# Patient Record
Sex: FEMALE | Race: WHITE | NOT HISPANIC OR LATINO
[De-identification: names, ages, dates, MRNs, and addresses within clinical notes are randomized per-mention and may not be internally consistent; named-entity substitution may affect disease eponyms.]

---

## 2017-02-27 ENCOUNTER — APPOINTMENT (OUTPATIENT)
Dept: SURGERY | Facility: CLINIC | Age: 47
End: 2017-02-27

## 2017-02-27 DIAGNOSIS — Z80.49 FAMILY HISTORY OF MALIGNANT NEOPLASM OF OTHER GENITAL ORGANS: ICD-10-CM

## 2017-02-27 RX ORDER — TAMOXIFEN CITRATE 20 MG/1
TABLET, FILM COATED ORAL
Refills: 0 | Status: ACTIVE | COMMUNITY

## 2017-06-19 ENCOUNTER — APPOINTMENT (OUTPATIENT)
Dept: SURGERY | Facility: CLINIC | Age: 47
End: 2017-06-19

## 2017-06-19 VITALS
DIASTOLIC BLOOD PRESSURE: 80 MMHG | RESPIRATION RATE: 12 BRPM | HEIGHT: 65 IN | SYSTOLIC BLOOD PRESSURE: 120 MMHG | TEMPERATURE: 98 F | OXYGEN SATURATION: 98 % | HEART RATE: 70 BPM | BODY MASS INDEX: 23.32 KG/M2 | WEIGHT: 140 LBS

## 2017-12-11 ENCOUNTER — APPOINTMENT (OUTPATIENT)
Dept: SURGERY | Facility: CLINIC | Age: 47
End: 2017-12-11
Payer: COMMERCIAL

## 2017-12-11 VITALS
TEMPERATURE: 99 F | WEIGHT: 130 LBS | DIASTOLIC BLOOD PRESSURE: 77 MMHG | HEIGHT: 65 IN | RESPIRATION RATE: 13 BRPM | HEART RATE: 72 BPM | BODY MASS INDEX: 21.66 KG/M2 | OXYGEN SATURATION: 96 % | SYSTOLIC BLOOD PRESSURE: 136 MMHG

## 2017-12-11 DIAGNOSIS — Z80.1 FAMILY HISTORY OF MALIGNANT NEOPLASM OF TRACHEA, BRONCHUS AND LUNG: ICD-10-CM

## 2017-12-11 DIAGNOSIS — C50.912 MALIGNANT NEOPLASM OF UNSPECIFIED SITE OF LEFT FEMALE BREAST: ICD-10-CM

## 2017-12-11 DIAGNOSIS — R59.9 ENLARGED LYMPH NODES, UNSPECIFIED: ICD-10-CM

## 2017-12-11 PROCEDURE — 99214 OFFICE O/P EST MOD 30 MIN: CPT

## 2017-12-13 PROBLEM — C50.912 DUCTAL CARCINOMA OF LEFT BREAST: Status: RESOLVED | Noted: 2017-12-13 | Resolved: 2017-12-13

## 2017-12-13 PROBLEM — Z80.1 FAMILY HISTORY OF LUNG CANCER: Status: ACTIVE | Noted: 2017-12-13

## 2017-12-13 PROBLEM — R59.9 ENLARGED LYMPH NODE: Status: ACTIVE | Noted: 2017-12-13

## 2017-12-13 RX ORDER — CEPHALEXIN 500 MG/1
500 CAPSULE ORAL
Qty: 10 | Refills: 0 | Status: COMPLETED | COMMUNITY
Start: 2017-10-13

## 2018-03-19 ENCOUNTER — APPOINTMENT (OUTPATIENT)
Dept: SURGERY | Facility: CLINIC | Age: 48
End: 2018-03-19
Payer: COMMERCIAL

## 2018-03-19 VITALS
BODY MASS INDEX: 21.66 KG/M2 | HEART RATE: 88 BPM | DIASTOLIC BLOOD PRESSURE: 79 MMHG | OXYGEN SATURATION: 100 % | SYSTOLIC BLOOD PRESSURE: 124 MMHG | HEIGHT: 65 IN | TEMPERATURE: 98.3 F | WEIGHT: 130 LBS

## 2018-03-19 PROCEDURE — 99213 OFFICE O/P EST LOW 20 MIN: CPT

## 2018-03-20 RX ORDER — NITROFURANTOIN (MONOHYDRATE/MACROCRYSTALS) 25; 75 MG/1; MG/1
100 CAPSULE ORAL
Qty: 10 | Refills: 0 | Status: ACTIVE | COMMUNITY
Start: 2018-01-02

## 2018-05-29 ENCOUNTER — APPOINTMENT (OUTPATIENT)
Dept: SURGERY | Facility: CLINIC | Age: 48
End: 2018-05-29
Payer: COMMERCIAL

## 2018-05-29 VITALS
OXYGEN SATURATION: 94 % | TEMPERATURE: 98.4 F | HEIGHT: 65 IN | DIASTOLIC BLOOD PRESSURE: 71 MMHG | WEIGHT: 133.38 LBS | BODY MASS INDEX: 22.22 KG/M2 | SYSTOLIC BLOOD PRESSURE: 128 MMHG | HEART RATE: 90 BPM

## 2018-05-29 PROCEDURE — 99214 OFFICE O/P EST MOD 30 MIN: CPT

## 2018-11-13 ENCOUNTER — APPOINTMENT (OUTPATIENT)
Dept: SURGERY | Facility: CLINIC | Age: 48
End: 2018-11-13

## 2019-01-14 ENCOUNTER — APPOINTMENT (OUTPATIENT)
Dept: SURGERY | Facility: CLINIC | Age: 49
End: 2019-01-14

## 2019-01-14 ENCOUNTER — APPOINTMENT (OUTPATIENT)
Dept: BREAST CENTER | Facility: CLINIC | Age: 49
End: 2019-01-14
Payer: COMMERCIAL

## 2019-01-14 VITALS
BODY MASS INDEX: 20.52 KG/M2 | WEIGHT: 123.31 LBS | TEMPERATURE: 98.5 F | DIASTOLIC BLOOD PRESSURE: 68 MMHG | SYSTOLIC BLOOD PRESSURE: 96 MMHG | HEART RATE: 60 BPM

## 2019-01-14 PROCEDURE — 99214 OFFICE O/P EST MOD 30 MIN: CPT

## 2019-01-14 NOTE — ASSESSMENT
[FreeTextEntry1] : 48 yo premenopausal female with history of Stage 1 left breast cancer ( IDC grade 1 ER 70% MO 90%) with DCIS, ADH, and LCIS treated in 2015 with bilateral mastectomy and SLNB (implant reconstruction).  Clinical breast exam is benign and no longer reports of left arm swelling. Recommendation to continue yoga because of positive stress relieving qualities and follow up in 6 months 7/2019 for CBE.\par 1. Follow up in 6 months 7/2019\par 2. Continue YOGA\par 3.  Continue Tamoxifen\par

## 2019-01-14 NOTE — HISTORY OF PRESENT ILLNESS
[FreeTextEntry1] : I had the pleasure of seeing Reina Sierra in the office today for w follow up clinical evaluation.\par \par Reina is a ghazal 46 yo premenopausal female who has a history of left breast cancer (Stage 1) diagnosed and treated in  with bilateral mastectomy and left sentinel lymph node biopsy.  she underwent expander/implant reconstruction.  Pathology on initial lumpectomy demonstrated invasive ductal carcinoma measuring 6mm ER+ [place] + [id number] negative with DCIS, ADH, and LCIS and negative margins.  Mastectomy final pathology was negative for residual malignancy and 0/5 nodes were negative.\par \par Reina has been taking Tamoxifen and tolerating it well.  \par \par She denies headache, blurry vision, chest pain, SOB, abdominal pain, joint aches, or difficulty walking. \par \par Reina is in good health and is .  she started menses at age 12 with delivery at age 35.  she still gets menses with normal flow.  She is under the care of Dr. Garcia (Ob-Gyn).\par \par Family history is significant for PGM dx with uterine cancer at age 78, PGF dx with bone cancer (unknown age) and Father diagnosed with lung cancer (unknown age). \par \par [facility]\par 2017: Impression:  small appearing subcutaneous lymph node correlating with palpable abnormality right lateral chest wall.\par \par We reviewed clinical examination and recent imaging. Clinical examination is benign. No dominant masses palpable.  She no longer reports pain down left arm or swelling. She had her nipple placed by Dr. Clemons on 2019 and is seeing him again on the  to discuss tattooing of the nipple.  Recommendation to continue yoga because of positive stress relieving qualities and follow up in 6 months 2019 for CBE.  Discussed and she agrees.\par \par All questions answered.\par \par \par \par \par

## 2019-09-09 ENCOUNTER — APPOINTMENT (OUTPATIENT)
Dept: SURGERY | Facility: CLINIC | Age: 49
End: 2019-09-09

## 2019-10-22 ENCOUNTER — APPOINTMENT (OUTPATIENT)
Dept: SURGERY | Facility: CLINIC | Age: 49
End: 2019-10-22
Payer: COMMERCIAL

## 2019-10-22 VITALS
WEIGHT: 124 LBS | DIASTOLIC BLOOD PRESSURE: 81 MMHG | HEART RATE: 60 BPM | SYSTOLIC BLOOD PRESSURE: 119 MMHG | HEIGHT: 65 IN | BODY MASS INDEX: 20.66 KG/M2

## 2019-10-22 PROCEDURE — 99214 OFFICE O/P EST MOD 30 MIN: CPT

## 2019-11-16 NOTE — PHYSICAL EXAM
[Normocephalic] : normocephalic [Atraumatic] : atraumatic [PERRL] : pupils equal, round and reactive to light [Sclera nonicteric] : sclera nonicteric [No Supraclavicular Adenopathy] : no supraclavicular adenopathy [Supple] : supple [No Axillary Lymphadenopathy] : no left axillary lymphadenopathy [Examined in the supine and seated position] : examined in the supine and seated position [No Edema] : no edema [No Ulceration] : no ulceration [de-identified] : Mastectomy with Implant in intact. No supraclavicular or axillary adenopathy. No dominant masses, normal to palpation. No skin changes.\par  [No Rashes] : no rashes [de-identified] : Mastectomy with Implant in intact. No supraclavicular or axillary adenopathy. No dominant masses, normal to palpation. No skin changes.

## 2019-11-16 NOTE — HISTORY OF PRESENT ILLNESS
[FreeTextEntry1] : I had the pleasure of seeing Reina Sierra in the office for a follow up visit.  She has a history of left breast cancer\par \par Reina is a ghazal  48 y/o premenopausal female who has a history of left breast cancer (Stage 1) diagnosed and treated in  with bilateral mastectomy and left sentinel lymph node biopsy. she underwent expander/implant reconstruction. Pathology on initial lumpectomy demonstrated invasive ductal carcinoma measuring 6mm ER+ ND + [id number] negative with DCIS, ADH, and LCIS and negative margins. Mastectomy final pathology was negative for residual malignancy and 0/5 nodes were negative.\par Recent imaging  concerning for adenomyosis of uterus \par \par  Reina has been taking Tamoxifen and tolerating it well. \par \par She denies headache, blurry vision, chest pain, SOB, abdominal pain, joint aches, or difficulty walking. \par \par Reina is in good health and is . she started menses at age 12 with delivery at age 35.  erratic menses  She is under the care of Dr. Garcia (Ob-Gyn).\par \par Family history is significant for PGM dx with uterine cancer at age 78, PGF dx with bone cancer (unknown age) and Father diagnosed with lung cancer (unknown age). \par \par Imaging \par PET /Ct scan 2/24 /15 interval b/l mastectomies with expanders, surgical changes hypermetabolic activity in left pectoralis, major muscle\par CT c/a/p  showed trace free fluid in the pelvis, 2 left ovarian cysts and 1cm hilar lymph node negative gyn workup in 10/15\par CT chest 16 stable\par 6 mo CT 17 unremarkable\par CT C/A/P 10/12/16 no evidence of disease\par bone density  osteopenia \par Pelvic sonogram  small fibroid seen , clear follicle Bilat, no adnexal masses\par CT C/A/P  no evidence of disease\par Chest sonogram  17 s,all appearing subcutaneous LN correlating with palpable abnormality Rt lateral chest wall\par CT C/A/P 18 stable finding no evidence of  metastatic disease\par CT C/A/P 9/7/19  Shotty retroperineal nodes appear stable. No adenopathy has developed Mild nonspecific right ureter possible phasic.Prominent uterine fundus suggestive of adenomyosis : stable left posterior myoma: bilateral adnexal cysts including rim- enhancing lesions on right adnexa. Small amount of free fluid in the cul-de -sac. This may  be considered physiologic for menstruating female. Correlation is  requested pelvic ultrasound with  Transvaginal and  transabdominal\par \par Pelvic S 19  showed findings raising concern about adenomyosis of uterus. Uterine fibroid 3.2cm right ovarian cyst. Non visualization of ovary.\par Clinical breast examination is recommended in 6 months.\par \par She understands and agrees to the recommendations.\par \par All questions answered.\par

## 2019-11-16 NOTE — ASSESSMENT
[FreeTextEntry1] : Ms Sierra is a ghazal  50 y/o premenopausal female who has a history of left breast cancer (Stage 1) diagnosed and treated in 2015 with bilateral mastectomy and left sentinel lymph node biopsy. she underwent expander/implant reconstruction. Pathology on initial lumpectomy demonstrated invasive ductal carcinoma measuring 6mm ER+ [place] + [id number] negative with DCIS, ADH, and LCIS and negative margins. Mastectomy final pathology was negative for residual malignancy and 0/5 nodes were negative.\par Recent imaging  concerning for adenomyosis of uterus \par 1. [facility] [employer] for Bra\par 2. Follow up in 6 months for CBE\par 3. Continue hormonal therapy

## 2020-07-20 ENCOUNTER — APPOINTMENT (OUTPATIENT)
Dept: SURGERY | Facility: CLINIC | Age: 50
End: 2020-07-20

## 2021-02-16 ENCOUNTER — APPOINTMENT (OUTPATIENT)
Dept: SURGERY | Facility: CLINIC | Age: 51
End: 2021-02-16

## 2021-06-03 ENCOUNTER — APPOINTMENT (OUTPATIENT)
Dept: SURGERY | Facility: CLINIC | Age: 51
End: 2021-06-03
Payer: COMMERCIAL

## 2021-06-03 VITALS
SYSTOLIC BLOOD PRESSURE: 111 MMHG | HEIGHT: 65 IN | WEIGHT: 129 LBS | TEMPERATURE: 98 F | OXYGEN SATURATION: 98 % | BODY MASS INDEX: 21.49 KG/M2 | HEART RATE: 64 BPM | DIASTOLIC BLOOD PRESSURE: 71 MMHG

## 2021-06-03 PROCEDURE — 99214 OFFICE O/P EST MOD 30 MIN: CPT

## 2021-06-03 PROCEDURE — 99072 ADDL SUPL MATRL&STAF TM PHE: CPT

## 2021-06-03 NOTE — HISTORY OF PRESENT ILLNESS
[FreeTextEntry1] : I had the pleasure of seeing Reina Sierra in the office for a follow up visit.  She has a history of left breast cancer.\par \par Reina is a ghazal  49 y/o premenopausal female who has a history of left breast cancer (Stage 1) diagnosed and treated in 2015 with bilateral mastectomy and left sentinel lymph node biopsy. she underwent expander/implant reconstruction. Pathology on initial lumpectomy demonstrated invasive ductal carcinoma measuring 6 mm ER+ [place] + [id number] negative with DCIS, ADH, and LCIS and negative margins. Mastectomy final pathology was negative for residual malignancy and 0/5 nodes were negative.\par Recent imaging  concerning for adenomyosis of uterus \par \par  Reina has been taking Tamoxifen and tolerating it well. \par \par Family history is significant for PGM dx with uterine cancer at age 78, PGF dx with bone cancer (unknown age) and Father diagnosed with lung cancer (unknown age). \par \par Imaging \par PET /Ct scan 2/24 /15 interval b/l mastectomies with expanders, surgical changes hypermetabolic activity in left pectoralis, major muscle\par CT c/a/p 9/9 showed trace free fluid in the pelvis, 2 left ovarian cysts and 1cm hilar lymph node negative gyn workup in 10/15\par CT chest 1/16/16 stable\par 6 mo CT 7/13/17 unremarkable\par CT C/A/P 10/12/16 no evidence of disease\par bone density 4/17 osteopenia \par Pelvic sonogram 4/17 small fibroid seen , clear follicle Bilat, no adnexal masses\par CT C/A/P 9/17 no evidence of disease\par Chest sonogram  6/22/17 s,all appearing subcutaneous LN correlating with palpable abnormality Rt lateral chest wall\par CT C/A/P 9/13/18 stable finding no evidence of  metastatic disease\par CT C/A/P 9/7/19  Shotty retroperineal nodes appear stable. No adenopathy has developed Mild nonspecific right ureter possible phasic.Prominent uterine fundus suggestive of adenomyosis : stable left posterior myoma: bilateral adnexal cysts including rim- enhancing lesions on right adnexa. Small amount of free fluid in the cul-de -sac. This may  be considered physiologic for menstruating female. Correlation is  requested pelvic ultrasound with  Transvaginal and  transabdominal\par \par We reviewed clinical breast exam and clinical breast exam is benign.  She is feeling well and tolerating hormonal therapy.  Recommendation for follow up in 6 months.  \par \par She understands and agrees with plan.  All questions answered.\par

## 2021-06-03 NOTE — ASSESSMENT
[FreeTextEntry1] : Ms Sierra is a ghazal  51 y/o premenopausal female who has a history of left breast cancer (Stage 1) diagnosed and treated in 2015 with bilateral mastectomy and left sentinel lymph node biopsy. she underwent expander/implant reconstruction. Pathology on initial lumpectomy demonstrated invasive ductal carcinoma measuring 6mm ER+ [place] + [id number] negative with DCIS, ADH, and LCIS and negative margins. Mastectomy final pathology was negative for residual malignancy and 0/5 nodes were negative.  Recommendation for\par 1. Follow up with Dr. Rodríguez\par 2. Follow up in 6 months for CBE\par 3. Continue hormonal therapy

## 2021-06-03 NOTE — PHYSICAL EXAM
[Normocephalic] : normocephalic [Atraumatic] : atraumatic [PERRL] : pupils equal, round and reactive to light [Sclera nonicteric] : sclera nonicteric [Supple] : supple [No Supraclavicular Adenopathy] : no supraclavicular adenopathy [Examined in the supine and seated position] : examined in the supine and seated position [No Axillary Lymphadenopathy] : no left axillary lymphadenopathy [No Edema] : no edema [No Rashes] : no rashes [No Ulceration] : no ulceration [de-identified] : Mastectomy with Implant in intact. No supraclavicular or axillary adenopathy. No dominant masses, normal to palpation. No skin changes.\par  [de-identified] : Mastectomy with Implant in intact. No supraclavicular or axillary adenopathy. No dominant masses, normal to palpation. No skin changes.

## 2022-02-22 ENCOUNTER — APPOINTMENT (OUTPATIENT)
Dept: SURGERY | Facility: CLINIC | Age: 52
End: 2022-02-22

## 2022-03-04 ENCOUNTER — APPOINTMENT (OUTPATIENT)
Dept: SURGERY | Facility: CLINIC | Age: 52
End: 2022-03-04
Payer: COMMERCIAL

## 2022-03-04 DIAGNOSIS — Z85.3 PERSONAL HISTORY OF MALIGNANT NEOPLASM OF BREAST: ICD-10-CM

## 2022-03-04 DIAGNOSIS — Z90.13 ACQUIRED ABSENCE OF BILATERAL BREASTS AND NIPPLES: ICD-10-CM

## 2022-03-04 PROCEDURE — 99214 OFFICE O/P EST MOD 30 MIN: CPT

## 2022-03-04 NOTE — HISTORY OF PRESENT ILLNESS
[FreeTextEntry1] : I had the pleasure of seeing Reina Sierra in the office for a follow up visit.  She has a history of left breast cancer.\par \par Reina is a ghazal  50 y/o premenopausal female who has a history of left breast cancer (Stage 1) diagnosed and treated in 2015 with bilateral mastectomy and left sentinel lymph node biopsy. she underwent expander/implant reconstruction. Pathology on initial lumpectomy demonstrated invasive ductal carcinoma measuring 6 mm ER+ [place] + [id number] negative with DCIS, ADH, and LCIS and negative margins. Mastectomy final pathology was negative for residual malignancy and 0/5 nodes were negative.\par Recent imaging  concerning for adenomyosis of uterus \par \par  Reina has been taking Tamoxifen and tolerating it well. \par \par Family history is significant for PGM dx with uterine cancer at age 78, PGF dx with bone cancer (unknown age) and Father diagnosed with lung cancer (unknown age). \par \par Imaging \par PET /Ct scan 2/24 /15 interval b/l mastectomies with expanders, surgical changes hypermetabolic activity in left pectoralis, major muscle\par CT c/a/p 9/9 showed trace free fluid in the pelvis, 2 left ovarian cysts and 1cm hilar lymph node negative gyn workup in 10/15\par CT chest 1/16/16 stable\par 6 mo CT 7/13/17 unremarkable\par CT C/A/P 10/12/16 no evidence of disease\par bone density 4/17 osteopenia \par Pelvic sonogram 4/17 small fibroid seen , clear follicle Bilat, no adnexal masses\par CT C/A/P 9/17 no evidence of disease\par Chest sonogram  6/22/17 s,all appearing subcutaneous LN correlating with palpable abnormality Rt lateral chest wall\par CT C/A/P 9/13/18 stable finding no evidence of  metastatic disease\par CT C/A/P 9/7/19  Shotty retroperineal nodes appear stable. No adenopathy has developed Mild nonspecific right ureter possible phasic.Prominent uterine fundus suggestive of adenomyosis : stable left posterior myoma: bilateral adnexal cysts including rim- enhancing lesions on right adnexa. Small amount of free fluid in the cul-de -sac. This may  be considered physiologic for menstruating female. Correlation is  requested pelvic ultrasound with  Transvaginal and  transabdominal\par \par We reviewed clinical breast exam and clinical breast exam is benign.  She is feeling well and tolerating hormonal therapy.  Recommendation for follow up in 6 months.  \par \par She understands and agrees with plan.  All questions answered.\par

## 2022-03-04 NOTE — ASSESSMENT
[FreeTextEntry1] : Ms Sierra is a ghazal  52 y/o premenopausal female who has a history of left breast cancer (Stage 1) diagnosed and treated in 2015 with bilateral mastectomy and left sentinel lymph node biopsy. she underwent expander/implant reconstruction. Pathology on initial lumpectomy demonstrated invasive ductal carcinoma measuring 6mm ER+ [place] + [id number] negative with DCIS, ADH, and LCIS and negative margins. Mastectomy final pathology was negative for residual malignancy and 0/5 nodes were negative.  Recommendation for\par 1. Follow up with Dr. Rodríguez\par 2. Follow up in 6 months for CBE\par 3. Continue hormonal therapy

## 2022-03-04 NOTE — PHYSICAL EXAM
[Normocephalic] : normocephalic [Atraumatic] : atraumatic [PERRL] : pupils equal, round and reactive to light [Sclera nonicteric] : sclera nonicteric [Supple] : supple [No Supraclavicular Adenopathy] : no supraclavicular adenopathy [Examined in the supine and seated position] : examined in the supine and seated position [No Axillary Lymphadenopathy] : no left axillary lymphadenopathy [No Edema] : no edema [No Rashes] : no rashes [No Ulceration] : no ulceration [de-identified] : Mastectomy with Implant in intact. No supraclavicular or axillary adenopathy. No dominant masses, normal to palpation. No skin changes.\par  [de-identified] : Mastectomy with Implant in intact. No supraclavicular or axillary adenopathy. No dominant masses, normal to palpation. No skin changes.

## 2022-09-15 PROBLEM — Z86.000 HISTORY OF DUCTAL CARCINOMA IN SITU (DCIS) OF BREAST: Status: ACTIVE | Noted: 2017-02-27

## 2022-09-15 PROBLEM — Z08 ENCOUNTER FOR FOLLOW-UP SURVEILLANCE OF BREAST CANCER: Status: ACTIVE | Noted: 2017-06-19

## 2022-09-16 ENCOUNTER — APPOINTMENT (OUTPATIENT)
Dept: SURGERY | Facility: CLINIC | Age: 52
End: 2022-09-16

## 2022-09-16 VITALS
OXYGEN SATURATION: 98 % | BODY MASS INDEX: 22.49 KG/M2 | RESPIRATION RATE: 16 BRPM | WEIGHT: 135 LBS | SYSTOLIC BLOOD PRESSURE: 104 MMHG | HEIGHT: 65 IN | HEART RATE: 64 BPM | DIASTOLIC BLOOD PRESSURE: 62 MMHG

## 2022-09-16 DIAGNOSIS — Z90.13 ACQUIRED ABSENCE OF BILATERAL BREASTS AND NIPPLES: ICD-10-CM

## 2022-09-16 DIAGNOSIS — Z00.00 ENCOUNTER FOR GENERAL ADULT MEDICAL EXAMINATION W/OUT ABNORMAL FINDINGS: ICD-10-CM

## 2022-09-16 DIAGNOSIS — Z08 ENCOUNTER FOR FOLLOW-UP EXAMINATION AFTER COMPLETED TREATMENT FOR MALIGNANT NEOPLASM: ICD-10-CM

## 2022-09-16 DIAGNOSIS — Z86.000 PERSONAL HISTORY OF IN-SITU NEOPLASM OF BREAST: ICD-10-CM

## 2022-09-16 DIAGNOSIS — Z85.3 ENCOUNTER FOR FOLLOW-UP EXAMINATION AFTER COMPLETED TREATMENT FOR MALIGNANT NEOPLASM: ICD-10-CM

## 2022-09-16 PROCEDURE — 99214 OFFICE O/P EST MOD 30 MIN: CPT

## 2022-09-16 NOTE — ASSESSMENT
[FreeTextEntry1] : Ms Sierra is a ghazal  50 y/o premenopausal female who has a history of left breast cancer (Stage 1) diagnosed and treated in 2015 with bilateral mastectomy and left sentinel lymph node biopsy. She underwent expander/implant reconstruction. Pathology on initial lumpectomy demonstrated invasive ductal carcinoma measuring 6mm ER+ [place] + [id number] negative with DCIS, ADH, and LCIS and negative margins. Mastectomy final pathology was negative for residual malignancy and 0/5 nodes were negative.  Recommendation for\par 1. Follow up with Dr. Rodríguez\par 2. Follow up in 6 months for CBE\par 3. Continue hormonal therapy\par 4. Will reach out regarding possible breast cancer index testing

## 2022-09-16 NOTE — PHYSICAL EXAM
[Normocephalic] : normocephalic [Atraumatic] : atraumatic [PERRL] : pupils equal, round and reactive to light [Sclera nonicteric] : sclera nonicteric [Supple] : supple [No Supraclavicular Adenopathy] : no supraclavicular adenopathy [Examined in the supine and seated position] : examined in the supine and seated position [No Axillary Lymphadenopathy] : no left axillary lymphadenopathy [No Edema] : no edema [No Rashes] : no rashes [No Ulceration] : no ulceration [EOMI] : extra ocular movement intact [de-identified] : Mastectomy with Implant in intact. No supraclavicular or axillary adenopathy. No dominant masses, normal to palpation. No skin changes.\par  [de-identified] : Mastectomy with Implant in intact. No supraclavicular or axillary adenopathy. No dominant masses, normal to palpation. No skin changes.

## 2022-09-16 NOTE — HISTORY OF PRESENT ILLNESS
[FreeTextEntry1] : I had the pleasure of seeing Reina Sierra in the office for a follow up visit.  She has a history of left breast cancer.\par \par Reina is a ghazal  50 y/o premenopausal female who has a history of left breast cancer (Stage 1) diagnosed and treated in 2015 with bilateral mastectomy and left sentinel lymph node biopsy. She underwent expander/implant reconstruction. Pathology on initial lumpectomy demonstrated invasive ductal carcinoma measuring 6 mm ER+ [place] + [id number] negative with DCIS, ADH, and LCIS and negative margins. Mastectomy final pathology was negative for residual malignancy and 0/5 nodes were negative.\par \par  Reina has been taking Tamoxifen and tolerating it well. \par \par Family history is significant for PGM dx with uterine cancer at age 78, PGF dx with bone cancer (unknown age) and Father diagnosed with lung cancer (unknown age). \par \par Imaging \par PET /Ct scan 2/24 /15 interval b/l mastectomies with expanders, surgical changes hypermetabolic activity in left pectoralis, major muscle\par CT c/a/p 9/9 showed trace free fluid in the pelvis, 2 left ovarian cysts and 1cm hilar lymph node negative gyn workup in 10/15\par CT chest 1/16/16 stable\par 6 mo CT 7/13/17 unremarkable\par CT C/A/P 10/12/16 no evidence of disease\par bone density 4/17 osteopenia \par Pelvic sonogram 4/17 small fibroid seen , clear follicle Bilat, no adnexal masses\par CT C/A/P 9/17 no evidence of disease\par Chest sonogram  6/22/17 s,all appearing subcutaneous LN correlating with palpable abnormality Rt lateral chest wall\par CT C/A/P 9/13/18 stable finding no evidence of  metastatic disease\par CT C/A/P 9/7/19  Shotty retroperineal nodes appear stable. No adenopathy has developed Mild nonspecific right ureter possible phasic.Prominent uterine fundus suggestive of adenomyosis : stable left posterior myoma: bilateral adnexal cysts including rim- enhancing lesions on right adnexa. Small amount of free fluid in the cul-de -sac. This may  be considered physiologic for menstruating female. Correlation is  requested pelvic ultrasound with  Transvaginal and  transabdominal\par \par \par She denies dominant breast mass, skin changes or nipple discharge. She denies headaches, blurry vision, chest pain, SOB, abdominal pain, joint aches or difficult walking.\par \par We reviewed clinical breast exam and clinical breast exam is benign.  She is feeling well and tolerating hormonal therapy.  Recommendation for follow up in 6 months. She understands and agrees with plan.  All questions answered.\par